# Patient Record
Sex: FEMALE | Race: WHITE | NOT HISPANIC OR LATINO | Employment: UNEMPLOYED | URBAN - METROPOLITAN AREA
[De-identification: names, ages, dates, MRNs, and addresses within clinical notes are randomized per-mention and may not be internally consistent; named-entity substitution may affect disease eponyms.]

---

## 2017-01-10 ENCOUNTER — OFFICE VISIT (OUTPATIENT)
Dept: MEDICAL GROUP | Age: 72
End: 2017-01-10
Payer: MEDICARE

## 2017-01-10 VITALS
SYSTOLIC BLOOD PRESSURE: 138 MMHG | BODY MASS INDEX: 37.32 KG/M2 | OXYGEN SATURATION: 91 % | DIASTOLIC BLOOD PRESSURE: 78 MMHG | TEMPERATURE: 97.6 F | HEIGHT: 63 IN | WEIGHT: 210.6 LBS | HEART RATE: 67 BPM

## 2017-01-10 DIAGNOSIS — E78.00 HYPERCHOLESTEROLEMIA: ICD-10-CM

## 2017-01-10 DIAGNOSIS — Z12.31 VISIT FOR SCREENING MAMMOGRAM: ICD-10-CM

## 2017-01-10 DIAGNOSIS — K90.0 CELIAC DISEASE: ICD-10-CM

## 2017-01-10 DIAGNOSIS — J45.30 MILD PERSISTENT ASTHMA WITHOUT COMPLICATION: ICD-10-CM

## 2017-01-10 DIAGNOSIS — L30.9 ECZEMA OF BOTH HANDS: ICD-10-CM

## 2017-01-10 DIAGNOSIS — Z23 NEED FOR PNEUMOCOCCAL VACCINATION: ICD-10-CM

## 2017-01-10 PROCEDURE — 90732 PPSV23 VACC 2 YRS+ SUBQ/IM: CPT | Performed by: INTERNAL MEDICINE

## 2017-01-10 PROCEDURE — 99214 OFFICE O/P EST MOD 30 MIN: CPT | Mod: 25 | Performed by: INTERNAL MEDICINE

## 2017-01-10 PROCEDURE — G0009 ADMIN PNEUMOCOCCAL VACCINE: HCPCS | Performed by: INTERNAL MEDICINE

## 2017-01-10 NOTE — MR AVS SNAPSHOT
"        Rayne Colorado   1/10/2017 2:00 PM   Office Visit   MRN: [id number]    Department:  [address] [place] [address]   Dept Phone:  [phone]    Description:  Female : 1945   Provider:  Carol Jang M.D.           Reason for Visit     Follow-Up Labs      Allergies as of 1/10/2017     No Known Allergies      You were diagnosed with     Mild persistent asthma without complication   [209268]       Hypercholesterolemia   [634700]       Celiac disease   [579.0.ICD-9-CM]       Need for pneumococcal vaccination   [443165]       Visit for screening mammogram   [287336]         Vital Signs     Blood Pressure Pulse Temperature Height Weight Body Mass Index    138/78 mmHg 67 36.4 °C (97.6 °F) 1.588 m (5' 2.5\") 95.528 kg (210 lb 9.6 oz) 37.88 kg/m2    Oxygen Saturation Smoking Status                91% Former Smoker          Basic Information     Date Of Birth Sex Race Ethnicity Preferred Language    1945 Female White Non- English      Your appointments     2017 11:20 AM   Established Patient with Carol Jang M.D.   [address] [id number] [phone]           You will be receiving a confirmation call a few days before your appointment from our automated call confirmation system.              Problem List              ICD-10-CM Priority Class Noted - Resolved    Celiac disease K90.0   10/4/2016 - Present    Psoriasis L40.9   10/4/2016 - Present    Mild persistent asthma without complication J45.30   10/4/2016 - Present    Hypercholesterolemia E78.00   1/10/2017 - Present      Health Maintenance        Date Due Completion Dates    MAMMOGRAM 7/10/1986 ---    COLONOSCOPY 7/10/1996 ---    IMM ZOSTER VACCINE 7/10/2006 ---    BONE DENSITY 7/10/2011 ---    IMM INFLUENZA (1) 2016    IMM PNEUMOCOCCAL 65+ (ADULT) LOW/MEDIUM RISK SERIES (2 of 2 - PPSV23) 2016    IMM DTaP/Tdap/Td Vaccine (2 - Td) 2/10/2026 " 2/10/2016            Current Immunizations     13-VALENT PCV PREVNAR 11/23/2015    Influenza TIV (IM) 11/5/2015    Pneumococcal polysaccharide vaccine (PPSV-23)  Incomplete    Tdap Vaccine 2/10/2016      Below and/or attached are the medications your provider expects you to take. Review all of your home medications and newly ordered medications with your provider and/or pharmacist. Follow medication instructions as directed by your provider and/or pharmacist. Please keep your medication list with you and share with your provider. Update the information when medications are discontinued, doses are changed, or new medications (including over-the-counter products) are added; and carry medication information at all times in the event of emergency situations     Allergies:  No Known Allergies          Medications  Valid as of: January 10, 2017 -  3:01 PM    Generic Name Brand Name Tablet Size Instructions for use    Albuterol Sulfate (Aero Soln) albuterol 108 (90 BASE) MCG/ACT Inhale 2 Puffs by mouth every 6 hours as needed for Shortness of Breath.        Cholecalciferol (Cap) Vitamin D 2000 UNITS Take  by mouth.        Clobetasol Propionate (Cream) TEMOVATE 0.05 % Apply twice a day on affected skin for 7 days.        Fluticasone-Salmeterol (AEROSOL POWDER, BREATH ACTIVATED) ADVAIR DISKUS 500-50 MCG/DOSE         Montelukast Sodium (Tab) SINGULAIR 10 MG Take 1 Tab by mouth every day.        Probiotic Product   Take  by mouth.        Psyllium   Take  by mouth.        .                 Medicines prescribed today were sent to:     [facility] [address] [address]    Phone: [phone] Fax: [phone]    Open 24 Hours?: No      Medication refill instructions:       If your prescription bottle indicates you have medication refills left, it is not necessary to call your provider’s office. Please contact your pharmacy and they will refill your medication.     If your prescription bottle indicates you do not have any refills left, you may request refills at any time through one of the following ways: The online [employer] system (except Urgent Care), by calling your provider’s office, or by asking your pharmacy to contact your provider’s office with a refill request. Medication refills are processed only during regular business hours and may not be available until the next business day. Your provider may request additional information or to have a follow-up visit with you prior to refilling your medication.   *Please Note: Medication refills are assigned a new Rx number when refilled electronically. Your pharmacy may indicate that no refills were authorized even though a new prescription for the same medication is available at the pharmacy. Please request the medicine by name with the pharmacy before contacting your provider for a refill.        Your To Do List     Future Labs/Procedures Complete By Expires    COMP METABOLIC PANEL  As directed 1/11/2018    LIPID PROFILE  As directed 1/11/2018    MA-SCREEN MAMMO W/CAD-BILAT  As directed 2/11/2018         Ed[employer]ics Access Code: [email or web address]  Expires: 2/9/2017  3:01 PM    Your email address is not on file at [facility].  Email Addresses are required for you to sign up for [employer], please contact [phone] to verify your personal information and to provide your email address prior to attempting to register for [employer].    Rayne Colorado  [address] [place] [address]    [employer]ics  A secure, online tool to manage your health information     [facility]’s Edlogics® is a secure, online tool that connects you to your personalized health information from the privacy of your home -- day or night - making it very easy for you to manage your healthcare. Once the activation process is completed, you can even access your medical information using the Ed[employer] jayden, which is available for free in the Apple Jayden  store or Google Play store.     To learn more about [facility], visit [email or web address]    There are two levels of access available (as shown below):   My Chart Features  [facility]  Specialists [facility]  Urgent  Care Non-[facility] Doctor   Email your healthcare team securely and privately 24/7 X X X    Manage appointments: schedule your next appointment; view details of past/upcoming appointments X      Request prescription refills. X      View recent personal medical records, including lab and immunizations X X X X   View health record, including health history, allergies, medications X X X X   Read reports about your outpatient visits, procedures, consult and ER notes X X X X   See your discharge summary, which is a recap of your hospital and/or ER visit that includes your diagnosis, lab results, and care plan X X  X     How to register for [facility]:  Once your e-mail address has been verified, follow the following steps to sign up for [facility].     1. Go to  [email or web address]  2. Click on the Sign Up Now box, which takes you to the New Member Sign Up page. You will need to provide the following information:  a. Enter your [facility] Access Code exactly as it appears at the top of this page. (You will not need to use this code after you’ve completed the sign-up process. If you do not sign up before the expiration date, you must request a new code.)   b. Enter your date of birth.   c. Enter your home email address.   d. Click Submit, and follow the next screen’s instructions.  3. Create a [facility] ID. This will be your [facility] login ID and cannot be changed, so think of one that is secure and easy to remember.  4. Create a [facility] password. You can change your password at any time.  5. Enter your Password Reset Question and Answer. This can be used at a later time if you forget your password.   6. Enter your e-mail address. This allows you to receive e-mail notifications when  new information is available in [employer].  7. Click Sign Up. You can now view your health information.    For assistance activating your [employer] account, call [phone]

## 2017-01-10 NOTE — Clinical Note
[facility]  Carol Jang M.D.  [address] [place] [address]  Fax: [phone] Authorization for Release/Disclosure of Protected Health Information   Name: BROOKE COLORADO : 1945 SSN: [id number]   Address: [address] [place] [address] Phone:    [phone] (home)    I authorize the entity listed below to release/disclose the PHI below to [facility]/Carol Jang M.D.   Provider or Entity Name:       Address   City, State, [facility]   Phone:      Fax:     Reason for request: continuity of care   Information to be released:    [  ] LAST COLONOSCOPY, including any PATH REPORT [  ] LAST DEXA  [  ] LAST MAMMOGRAM  [  ] LAST PAP [  ] RETINA EXAM REPORT  [  ] IMMUNIZATION RECORDS  [  ] Release all info      [  ] Check here and initial the line next to each item to release ALL health information INCLUDING  _____ Care and treatment for drug and / or alcohol abuse  _____ HIV testing, infection status, or AIDS  _____ Genetic Testing    DATES OF SERVICE OR TIME PERIOD TO BE DISCLOSED: _____________  I understand and acknowledge that:  * This Authorization may be revoked at any time by you in writing, except if your health information has already been used or disclosed.  * Your health information that will be used or disclosed as a result of you signing this authorization could be re-disclosed by the recipient. If this occurs, your re-disclosed health information may no longer be protected by State or Federal laws.  * You may refuse to sign this Authorization. Your refusal will not affect your ability to obtain treatment.  * This Authorization becomes effective upon signing and will  on (date) __________. If no date is indicated, this Authorization will  one (1) year from the signature date.    Name: Brooke Colorado    Signature:     Date: 1/10/2017

## 2017-01-11 NOTE — PROGRESS NOTES
Subjective:   Rayne Colorado is a 71 y.o. female here today for evaluation and management of:      Celiac disease  Patient is taking Metamucil and probiotic daily. She has regular bowel movement without diarrhea or constipation. She avoid gluten. She stated that she has colonoscopy which she had consult in last year and was told to repeat it in 5 years. We will obtained a medical report from GI consultant.    Eczema of both hands  Patient was evaluated by dermatologist and was told that her rash is eczema. She is taking clobetasol cream and moisturizing cream without significant improvement.    Mild persistent asthma without complication  Patient is taking Advair diskus 500-50 mcg one puff twice a day and albuterol inhaler once a day as needed. She also takes singular 10 mg daily. Her symptoms well controlled with current regimens. She reported that her asthma was not well controlled when she was treated with low-dose Advair 250-50. She wanted to keep the same regimens for now. She quit smoking in 2007.    Hypercholesterolemia  Patient has high total cholesterol and LDL cholesterol. She has never been treated with medication for cholesterol.  Lipid panel on 1/3/17: Total cholesterol 225, HDL 78, , triglyceride 105, non-HDL cholesterol 147, cholesterol/HDL ratio 2.9.          Current medicines (including changes today)  Current Outpatient Prescriptions   Medication Sig Dispense Refill   • ADVAIR DISKUS 500-50 MCG/DOSE AEROSOL POWDER, BREATH ACTIVATED   1   • montelukast (SINGULAIR) 10 MG Tab Take 1 Tab by mouth every day. 30 Tab 11   • albuterol 108 (90 BASE) MCG/ACT Aero Soln inhalation aerosol Inhale 2 Puffs by mouth every 6 hours as needed for Shortness of Breath. 8.5 g 3   • clobetasol (TEMOVATE) 0.05 % Cream Apply twice a day on affected skin for 7 days. 1 Tube 3   • Probiotic Product (PROBIOTIC ADVANCED PO) Take  by mouth.     • Psyllium (METAMUCIL PO) Take  by mouth.     • Cholecalciferol (VITAMIN D)  "2000 UNITS Cap Take  by mouth.       No current facility-administered medications for this visit.     She  has a past medical history of Asthma and Celiac disease.    ROS   No chest pain, no shortness of breath, no abdominal pain       Objective:     Blood pressure 138/78, pulse 67, temperature 36.4 °C (97.6 °F), height 1.588 m (5' 2.5\"), weight 95.528 kg (210 lb 9.6 oz), SpO2 91 %. Body mass index is 37.88 kg/(m^2).   Physical Exam:  General: Alert, oriented and no acute distress.  Eye contact is good, speech goal directed, affect calm  HEENT: conjunctiva non-injected, sclera non-icteric.  Oral mucous membranes pink and moist with no lesions.  Pinna normal.   Lungs: Normal respiratory effort, clear to auscultation bilaterally with good excursion.  CV: regular rate and rhythm. No murmurs.   Abdomen: soft, non distended, nontender, Bowel sound normal.  Ext: no edema, color normal, vascularity normal, temperature normal  Skin: Scaly rash on both palms, dry skin on both hands    Assessment and Plan:   The following treatment plan was discussed     1. Mild persistent asthma without complication  - Well-controlled. Continue current regimens. Reviewed the side effects of advair and albuterol inhaler with her.  - Continue singular 10 mg daily.    2. Hypercholesterolemia  - Continue to control with diet and exercise.  - Advised to eat low fat, low carbohydrate and high fiber diet as well as do cardio physical exercise regularly.   - Recheck cholesterol in 6 months.  - COMP METABOLIC PANEL; Future  - LIPID PROFILE; Future    3. Celiac disease  - Well-controlled. Continue to avoid gluten diet. Continue probiotic and Metamucil.    4. Eczema of both hands  - Discussed to try over-the-counter coal tar cream and she can use clobetasol cream alternately.   - Advised to use moisturizing cream on both hands.     5. Need for pneumococcal vaccination  - Pneumovax 23- vaccine was given today after reviewing risks and benefits as well " as side effects of vaccine.  - PNEUMOCOCCAL POLYSACCHARIDE VACCINE 23-VALENT =>3YO SQ/IM    6. Visit for screening mammogram  - Order screening mammogram  - MA-SCREEN MAMMO W/CAD-BILAT; Future         Followup: Return in about 6 months (around 7/10/2017), or if symptoms worsen or fail to improve, for asthma, hypercholesterolemia, eczema, lab review.      Please note that this dictation was created using voice recognition software. I have made every reasonable attempt to correct obvious errors, but I expect that there may have unintended errors in text, spelling, punctuation, or grammar that I did not discover.

## 2017-01-11 NOTE — ASSESSMENT & PLAN NOTE
Patient was evaluated by dermatologist and was told that her rash is eczema. She is taking clobetasol cream and moisturizing cream without significant improvement.

## 2017-01-11 NOTE — ASSESSMENT & PLAN NOTE
Patient is taking Metamucil and probiotic daily. She has regular bowel movement without diarrhea or constipation. She avoid gluten. She stated that she has colonoscopy which she had consult in last year and was told to repeat it in 5 years. We will obtained a medical report from GI consultant.

## 2017-01-11 NOTE — ASSESSMENT & PLAN NOTE
Patient has high total cholesterol and LDL cholesterol. She has never been treated with medication for cholesterol.  Lipid panel on 1/3/17: Total cholesterol 225, HDL 78, , triglyceride 105, non-HDL cholesterol 147, cholesterol/HDL ratio 2.9.

## 2017-04-15 DIAGNOSIS — J45.30 MILD PERSISTENT ASTHMA WITHOUT COMPLICATION: ICD-10-CM

## 2017-06-27 ENCOUNTER — PATIENT OUTREACH (OUTPATIENT)
Dept: HEALTH INFORMATION MANAGEMENT | Facility: OTHER | Age: 72
End: 2017-06-27

## 2017-06-27 NOTE — PROGRESS NOTES
Outcome: CALL BACK    WebIZ Checked & Epic Updated:  yes    [facility] Verified: no    Attempt # 1

## 2017-06-30 ENCOUNTER — OFFICE VISIT (OUTPATIENT)
Dept: MEDICAL GROUP | Facility: CLINIC | Age: 72
End: 2017-06-30
Payer: MEDICARE

## 2017-06-30 VITALS
TEMPERATURE: 97.5 F | DIASTOLIC BLOOD PRESSURE: 82 MMHG | HEIGHT: 62 IN | SYSTOLIC BLOOD PRESSURE: 126 MMHG | BODY MASS INDEX: 38.64 KG/M2 | WEIGHT: 210 LBS | OXYGEN SATURATION: 96 % | HEART RATE: 53 BPM

## 2017-06-30 DIAGNOSIS — K90.0 CELIAC DISEASE: ICD-10-CM

## 2017-06-30 DIAGNOSIS — Z00.00 MEDICARE ANNUAL WELLNESS VISIT, INITIAL: Primary | ICD-10-CM

## 2017-06-30 DIAGNOSIS — L30.9 ECZEMA OF BOTH HANDS: ICD-10-CM

## 2017-06-30 DIAGNOSIS — J45.30 MILD PERSISTENT ASTHMA WITHOUT COMPLICATION: ICD-10-CM

## 2017-06-30 DIAGNOSIS — E78.00 HYPERCHOLESTEROLEMIA: ICD-10-CM

## 2017-06-30 PROCEDURE — G0438 PPPS, INITIAL VISIT: HCPCS | Performed by: NURSE PRACTITIONER

## 2017-06-30 RX ORDER — SODIUM PHOSPHATE,MONO-DIBASIC 19G-7G/118
500 ENEMA (ML) RECTAL
COMMUNITY

## 2017-06-30 RX ORDER — CHLORAL HYDRATE 500 MG
1000 CAPSULE ORAL 2 TIMES DAILY WITH MEALS
COMMUNITY

## 2017-06-30 RX ORDER — M-VIT,TX,IRON,MINS/CALC/FOLIC 27MG-0.4MG
1 TABLET ORAL DAILY
COMMUNITY

## 2017-06-30 RX ORDER — LORATADINE 10 MG/1
10 TABLET ORAL DAILY
COMMUNITY

## 2017-06-30 ASSESSMENT — PATIENT HEALTH QUESTIONNAIRE - PHQ9: CLINICAL INTERPRETATION OF PHQ2 SCORE: 0

## 2017-06-30 NOTE — ASSESSMENT & PLAN NOTE
Chronic condition managed with current medical regimen  Stable per review, labs of 1/13/2017 reviewed   Continue with diet and exercise  Followed by Carol Jang M.D..

## 2017-06-30 NOTE — ASSESSMENT & PLAN NOTE
Chronic condition managed with current medical regimen  Stable per review with occ flares   Continue with current meds, effective most of the time  Followed by derm

## 2017-06-30 NOTE — ASSESSMENT & PLAN NOTE
Chronic condition managed with current medical regimen  Stable per review with intermit flares even with a controlled diet, describes pain   Continue with gluten free diet  Followed by GI

## 2017-06-30 NOTE — MR AVS SNAPSHOT
"        Rayne Salinaspiotr   2017 1:00 PM   Office Visit   MRN: [id number]    Department:  [facility]   Dept Phone:  [phone]    Description:  Female : 1945   Provider:  RIC Bryson           Reason for Visit     Annual Exam initial      Allergies as of 2017     No Known Allergies      You were diagnosed with     Medicare annual wellness visit, initial   [845464]  -  Primary     Mild persistent asthma without complication   [527381]       Hypercholesterolemia   [634999]       Eczema of both hands   [8932262]       Celiac disease   [579.0.ICD-9-CM]         Vital Signs     Blood Pressure Pulse Temperature Height Weight Body Mass Index    126/82 mmHg 53 36.4 °C (97.5 °F) 1.575 m (5' 2.01\") 95.255 kg (210 lb) 38.40 kg/m2    Oxygen Saturation Smoking Status                96% Former Smoker          Basic Information     Date Of Birth Sex Race Ethnicity Preferred Language    1945 Female White Non- English      Your appointments     2017 11:20 AM   Established Patient with Carol Jang M.D.   [address]  [place] [address] [phone]           You will be receiving a confirmation call a few days before your appointment from our automated call confirmation system.            Aug 17, 2017 11:00 AM   MA SCRN10 with JEFF SAUCEDA MG 1   [facility] ([place])    [address]  [place] [id number] [phone]           No deodorant, powder, perfume or lotion under the arm or breast area.            Aug 17, 2017 11:15 AM   BONE DENSITY (DEXASCAN) with JEFF SAUCEDA BD 1   [facility]    [facility] [place]  [address]  [address]  [place] [id number] [phone]           No calcium supplements 24 hours prior to exam, including antacids or multivitamins w/calcium.  Pt may eat and drink normally.  No injection " procedures prior to Dexa on the same day.  No barium contrast, no CTs with IV or oral contrast, no Pet/CTs and no Nuc Med injections for 7 days prior to a Dexa (including Barium Swallow, Upper GI, Small Bowel Series).  If scheduling a Dexa on the same day as a CT with IV or oral contrast, any test with barium, Pet/CT or a Nuc Med with injection, always schedule the Dexa before the other study.              Problem List              ICD-10-CM Priority Class Noted - Resolved    Celiac disease K90.0   10/4/2016 - Present    Eczema of both hands L30.9   10/4/2016 - Present    Mild persistent asthma without complication J45.30   10/4/2016 - Present    Hypercholesterolemia E78.00   1/10/2017 - Present      Health Maintenance        Date Due Completion Dates    MAMMOGRAM 7/10/1986 ---    COLONOSCOPY 7/10/1996 ---    IMM ZOSTER VACCINE 7/10/2006 ---    BONE DENSITY 7/10/2011 ---    IMM DTaP/Tdap/Td Vaccine (2 - Td) 2/10/2026 2/10/2016            Current Immunizations     13-VALENT PCV PREVNAR 11/23/2015    Influenza TIV (IM) 11/5/2015    Pneumococcal polysaccharide vaccine (PPSV-23) 1/10/2017    Tdap Vaccine 2/10/2016      Below and/or attached are the medications your provider expects you to take. Review all of your home medications and newly ordered medications with your provider and/or pharmacist. Follow medication instructions as directed by your provider and/or pharmacist. Please keep your medication list with you and share with your provider. Update the information when medications are discontinued, doses are changed, or new medications (including over-the-counter products) are added; and carry medication information at all times in the event of emergency situations     Allergies:  No Known Allergies          Medications  Valid as of: June 30, 2017 -  2:38 PM    Generic Name Brand Name Tablet Size Instructions for use    Albuterol Sulfate (Aero Soln) albuterol 108 (90 BASE) MCG/ACT Inhale 2 Puffs by mouth every 6  hours as needed for Shortness of Breath.        Calcium Carbonate-Vitamin D   Take  by mouth.        Cholecalciferol (Cap) Vitamin D 2000 UNITS Take  by mouth.        Clobetasol Propionate (Cream) TEMOVATE 0.05 % Apply twice a day on affected skin for 7 days.        Fluticasone-Salmeterol (AEROSOL POWDER, BREATH ACTIVATED) ADVAIR DISKUS 500-50 MCG/DOSE inhale 1 puff by mouth twice a day IN THE MORNING AND EVENING APPROXIMATELY 12 HOURS APART        Glucosamine Sulfate (Cap) glucosamine Sulfate 500 MG Take 500 mg by mouth 3 times a day, with meals.        Loratadine (Tab) CLARITIN 10 MG Take 10 mg by mouth every day.        Montelukast Sodium (Tab) SINGULAIR 10 MG Take 1 Tab by mouth every day.        Multiple Vitamins-Minerals (Tab) THERAGRAN-M  Take 1 Tab by mouth every day.        Omega-3 Fatty Acids (Cap) fish oil 1000 MG Take 1,000 mg by mouth 3 times a day, with meals.        Probiotic Product   Take  by mouth.        Psyllium   Take  by mouth.        .                 Medicines prescribed today were sent to:     [facility] [address] [address]    Phone: [phone] Fax: [phone]    Open 24 Hours?: No      Medication refill instructions:       If your prescription bottle indicates you have medication refills left, it is not necessary to call your provider’s office. Please contact your pharmacy and they will refill your medication.    If your prescription bottle indicates you do not have any refills left, you may request refills at any time through one of the following ways: The online [employer] system (except Urgent Care), by calling your provider’s office, or by asking your pharmacy to contact your provider’s office with a refill request. Medication refills are processed only during regular business hours and may not be available until the next business day. Your provider may request additional information or to have a follow-up visit  "with you prior to refilling your medication.   *Please Note: Medication refills are assigned a new Rx number when refilled electronically. Your pharmacy may indicate that no refills were authorized even though a new prescription for the same medication is available at the pharmacy. Please request the medicine by name with the pharmacy before contacting your provider for a refill.        Instructions    Gluten-Free Diet for Celiac Disease  Gluten is a protein found in wheat, rye, barley, and triticale (a cross between wheat and rye) grains. People with celiac disease need to have a gluten-free diet. With celiac disease, gluten interferes with the absorption of food and may also cause intestinal injury.   Strict compliance is important even during symptom-free periods. This means eliminating all foods with gluten from your diet permanently. This requires some significant changes but is very manageable.  WHAT DO I NEED TO KNOW ABOUT A GLUTEN-FREE DIET?  · Look for items labeled with \"GF.\" Looking for GF will make it easier to identify products that are safe to eat.  · Read all labels. Gluten may have been added as a minor ingredient where least expected, such as in shredded cheeses or ice creams. Always check food labels and investigate questionable ingredients. Talk to your dietitian or health care provider if you have questions about certain foods or need help finding GF foods.  · Check when in doubt. If you are not sure whether an ingredient contains gluten, check with the . Note that some manufacturers may change ingredients without notice. Always read labels.    · Know how food is prepared. Since flour and cereal products are often used in the preparation of foods, it is important to be aware of the methods of preparation used, as well as the ingredients in the foods themselves. This is especially true when you are dining out. Ask restaurants if they have a gluten-free menu.  · Watch for " "cross-contamination. Cross-contamination occurs when gluten-free foods come into contact with foods that contain gluten. It often happens during the manufacturing process. Always check the ingredient list and for warnings on packages, such as \"may contain gluten.\"  · Eat a balanced diet. It is important to still get enough fiber, iron, and B vitamins in your diet. Look for enriched whole grain gluten-free products and continue to eat a well-balanced diet of the important non-grain items, such as vegetables, fruit, lean proteins, legumes, and dairy.  · Consider taking a gluten-free multivitamin and mineral supplement. Discuss this with your health care provider.  WHAT KEY WORDS HELP IDENTIFY GLUTEN?  Know key words to help identify gluten. A dietitian can help you identify possible harmful ingredients in the foods you normally eat. Words to check for on food labels include:   · Flour, enriched flour, bromated flour, white flour, durum flour, daquan flour, phosphated flour, self-rising flour, semolina, or farina.  · Starch, dextrin, modified food starch, or cereal.  · Thickening, fillers, or emulsifiers.  · Any kind of malt flavoring, extract, or syrup (malt is made from barley and includes malt vinegar, malted milk, and malted beverages).  · Hydrolyzed vegetable protein.  WHAT FOODS CAN I EAT?  Below is a list of common foods that are allowed with a gluten-free diet.   Grains  Products made from the following flours or grains: amaranth, bean flours, 100% buckwheat flour, corn, millet, nut flours or meals, GF oats, quinoa, rice, sorghum, teff, any all-purpose 100% GF flour mix, rice wafers, pure cornmeal tortillas, popcorn, some crackers, some chips, and hot cereals made from cornmeal. Ask your dietitian which specific hot and cold cereals are allowed. [place], rice or wild rice, and special GF pasta. Some Asian rice noodles or bean noodles. Arrowroot starch, corn bran, corn flour, corn germ, cornmeal, corn starch, " potato flour, potato starch flour, and rice bran. Rice flours: plain, brown, and sweet. Rice polish, soy flour, tapioca starch.  Vegetables   All plain, fresh, frozen, or canned vegetables.    Fruits   All fresh, frozen, canned, dried fruits, and fruit juices.   Meats and Other Protein Foods  Meat, fish, poultry, or eggs prepared without added wheat, rye, barley, or triticale. Some luncheon meat and some frankfurters. Pure meat. All aged cheese, most processed cheese products, some cottage cheese, and some cream cheese. Dried beans, dried peas, and lentils.    Dairy   Milk and yogurt made with allowed ingredients.   Beverages   Coffee (regular or decaffeinated), tea, herbal tea (read label to be sure that no wheat flour has been added). Carbonated beverages and some root beers. Wine, sake, and distilled spirits, such as gin, vodka, and whiskey. GF beers and GF ciders.   Sweets and Desserts  Sugar, honey, some syrups, molasses, jelly, jam, plain hard candy, marshmallows, gumdrops, homemade candies free of wheat, rye, barley, or triticale. Coconut. Custard, some pudding mixes, and homemade puddings from cornstarch, rice, and tapioca. Gelatin desserts, sorbets, frozen ice pops, and sherbet. Cake, cookies, and other desserts prepared with allowed flours. Some commercial ice creams. Ask your dietitian about specific brands of dessert that are allowed.   Fats and Oils   Butter, margarine, vegetable oil, sour cream not containing modified food starch, whipping cream, shortening, lard, cream, and some mayonnaise. Some commercial salad dressings. Peanut butter.   Other  Homemade broth and soups made with allowed ingredients; some canned or frozen soups. Any other combination or prepared foods that do not contain gluten. Monosodium glutamate (MSG). Cider, rice, and wine vinegar. Baking soda and baking powder. Certain soy sauces (Tamari). Ask your dietitian about specific brands that are allowed. Nuts, coconut, chocolate, and  pure cocoa powder. Salt, pepper, herbs, spices, extracts, and food colorings.  The items listed above may not be a complete list of allowed foods or beverages. Contact your dietitian for more options.   WHAT FOODS CAN I NOT EAT?  Below is a list of common foods that are not allowed with a gluten-free diet.   Grains  Barley, bran, bulgur, cracked wheat, daquan, malt, matzo, wheat germ, and all wheat and rye cereals including spelt and kamut. Avoid cereals containing malt as a flavoring, such as rice cereal. Also avoid regular noodles, spaghetti, macaroni, and most packaged rice mixes, and all others containing wheat, rye, barley, or triticale.   Vegetables   Most creamed vegetables, most vegetables canned in sauces, and any vegetables prepared with wheat, rye, barley, or triticale.   Fruits   Thickened or prepared fruits and some pie fillings.   Meats and Other Protein Sources  Any meat or meat alternative containing wheat, rye, barley, or gluten stabilizers (such as some hot dogs, salami, cold cuts, or sausage). Bread-containing products, such as Swiss steak, croquettes, and meatloaf. Most tuna canned in vegetable broth, turkey with hydrolyzed vegetable protein (HVP) injected as part of the basting, and any cheese product containing oat gum as an ingredient. Seitan. Imitation fish.  Dairy   Commercial chocolate milk, which may have cereal added, and malted milk.  Beverages   Certain cereal beverages. Beer and ciders (unless GF), vinny, malted milk, and some root beers.  Sweets and Desserts  Commercial candies containing wheat, rye, barley, or triticale. Certain toffees are dusted with wheat flour. Chocolate-coated nuts, which are often rolled in flour. Cakes, cookies, doughnuts, and pastries that are prepared with wheat, barley, rye, or triticale flour. Some commercial ice creams, ice cream flavors which contain cookies, crumbs, or cheesecake. Ice cream cones. Commercially prepared mixes for cakes, cookies, and other  desserts unless marked GF. Bread pudding and other puddings thickened with flour.  Fats and Oils   Some commercial salad dressings and sour cream containing modified food starch.   Condiments  Some gardner powder, some dry seasoning mixes, some gravy extracts, some meat sauces, some ketchup, some prepared mustard, horseradish.  Other  All soups containing wheat, rye, barley, or triticale flour. Bouillon and bouillon cubes that contain HVP. Combination or prepared foods that contain gluten. Some soy sauce, some chip dips, and some chewing gum. Yeast extract (contains barley). Caramel color (may contain malt).  The items listed above may not be a complete list of foods and beverages to avoid. Contact your dietitian for more information.     This information is not intended to replace advice given to you by your health care provider. Make sure you discuss any questions you have with your health care provider.     Document Released: 12/18/2006 Document Revised: 01/08/2016 Document Reviewed: 10/22/2014  [employer] Interactive Patient Education ©2016 [employer]            MyChart Status: Patient Declined

## 2017-06-30 NOTE — PATIENT INSTRUCTIONS
"Gluten-Free Diet for Celiac Disease  Gluten is a protein found in wheat, rye, barley, and triticale (a cross between wheat and rye) grains. People with celiac disease need to have a gluten-free diet. With celiac disease, gluten interferes with the absorption of food and may also cause intestinal injury.   Strict compliance is important even during symptom-free periods. This means eliminating all foods with gluten from your diet permanently. This requires some significant changes but is very manageable.  WHAT DO I NEED TO KNOW ABOUT A GLUTEN-FREE DIET?  · Look for items labeled with \"GF.\" Looking for GF will make it easier to identify products that are safe to eat.  · Read all labels. Gluten may have been added as a minor ingredient where least expected, such as in shredded cheeses or ice creams. Always check food labels and investigate questionable ingredients. Talk to your dietitian or health care provider if you have questions about certain foods or need help finding GF foods.  · Check when in doubt. If you are not sure whether an ingredient contains gluten, check with the . Note that some manufacturers may change ingredients without notice. Always read labels.    · Know how food is prepared. Since flour and cereal products are often used in the preparation of foods, it is important to be aware of the methods of preparation used, as well as the ingredients in the foods themselves. This is especially true when you are dining out. Ask restaurants if they have a gluten-free menu.  · Watch for cross-contamination. Cross-contamination occurs when gluten-free foods come into contact with foods that contain gluten. It often happens during the manufacturing process. Always check the ingredient list and for warnings on packages, such as \"may contain gluten.\"  · Eat a balanced diet. It is important to still get enough fiber, iron, and B vitamins in your diet. Look for enriched whole grain gluten-free products " and continue to eat a well-balanced diet of the important non-grain items, such as vegetables, fruit, lean proteins, legumes, and dairy.  · Consider taking a gluten-free multivitamin and mineral supplement. Discuss this with your health care provider.  WHAT KEY WORDS HELP IDENTIFY GLUTEN?  Know key words to help identify gluten. A dietitian can help you identify possible harmful ingredients in the foods you normally eat. Words to check for on food labels include:   · Flour, enriched flour, bromated flour, white flour, durum flour, daquan flour, phosphated flour, self-rising flour, semolina, or farina.  · Starch, dextrin, modified food starch, or cereal.  · Thickening, fillers, or emulsifiers.  · Any kind of malt flavoring, extract, or syrup (malt is made from barley and includes malt vinegar, malted milk, and malted beverages).  · Hydrolyzed vegetable protein.  WHAT FOODS CAN I EAT?  Below is a list of common foods that are allowed with a gluten-free diet.   Grains  Products made from the following flours or grains: amaranth, bean flours, 100% buckwheat flour, corn, millet, nut flours or meals, GF oats, quinoa, rice, sorghum, teff, any all-purpose 100% GF flour mix, rice wafers, pure cornmeal tortillas, popcorn, some crackers, some chips, and hot cereals made from cornmeal. Ask your dietitian which specific hot and cold cereals are allowed. [place], rice or wild rice, and special GF pasta. Some Asian rice noodles or bean noodles. Arrowroot starch, corn bran, corn flour, corn germ, cornmeal, corn starch, potato flour, potato starch flour, and rice bran. Rice flours: plain, brown, and sweet. Rice polish, soy flour, tapioca starch.  Vegetables   All plain, fresh, frozen, or canned vegetables.    Fruits   All fresh, frozen, canned, dried fruits, and fruit juices.   Meats and Other Protein Foods  Meat, fish, poultry, or eggs prepared without added wheat, rye, barley, or triticale. Some luncheon meat and some frankfurters.  Pure meat. All aged cheese, most processed cheese products, some cottage cheese, and some cream cheese. Dried beans, dried peas, and lentils.    Dairy   Milk and yogurt made with allowed ingredients.   Beverages   Coffee (regular or decaffeinated), tea, herbal tea (read label to be sure that no wheat flour has been added). Carbonated beverages and some root beers. Wine, sake, and distilled spirits, such as gin, vodka, and whiskey. GF beers and GF ciders.   Sweets and Desserts  Sugar, honey, some syrups, molasses, jelly, jam, plain hard candy, marshmallows, gumdrops, homemade candies free of wheat, rye, barley, or triticale. Coconut. Custard, some pudding mixes, and homemade puddings from cornstarch, rice, and tapioca. Gelatin desserts, sorbets, frozen ice pops, and sherbet. Cake, cookies, and other desserts prepared with allowed flours. Some commercial ice creams. Ask your dietitian about specific brands of dessert that are allowed.   Fats and Oils   Butter, margarine, vegetable oil, sour cream not containing modified food starch, whipping cream, shortening, lard, cream, and some mayonnaise. Some commercial salad dressings. Peanut butter.   Other  Homemade broth and soups made with allowed ingredients; some canned or frozen soups. Any other combination or prepared foods that do not contain gluten. Monosodium glutamate (MSG). Cider, rice, and wine vinegar. Baking soda and baking powder. Certain soy sauces (Tamari). Ask your dietitian about specific brands that are allowed. Nuts, coconut, chocolate, and pure cocoa powder. Salt, pepper, herbs, spices, extracts, and food colorings.  The items listed above may not be a complete list of allowed foods or beverages. Contact your dietitian for more options.   WHAT FOODS CAN I NOT EAT?  Below is a list of common foods that are not allowed with a gluten-free diet.   Grains  Barley, bran, bulgur, cracked wheat, daquan, malt, matzo, wheat germ, and all wheat and rye cereals  including spelt and kamut. Avoid cereals containing malt as a flavoring, such as rice cereal. Also avoid regular noodles, spaghetti, macaroni, and most packaged rice mixes, and all others containing wheat, rye, barley, or triticale.   Vegetables   Most creamed vegetables, most vegetables canned in sauces, and any vegetables prepared with wheat, rye, barley, or triticale.   Fruits   Thickened or prepared fruits and some pie fillings.   Meats and Other Protein Sources  Any meat or meat alternative containing wheat, rye, barley, or gluten stabilizers (such as some hot dogs, salami, cold cuts, or sausage). Bread-containing products, such as Swiss steak, croquettes, and meatloaf. Most tuna canned in vegetable broth, turkey with hydrolyzed vegetable protein (HVP) injected as part of the basting, and any cheese product containing oat gum as an ingredient. Seitan. Imitation fish.  Dairy   Commercial chocolate milk, which may have cereal added, and malted milk.  Beverages   Certain cereal beverages. Beer and ciders (unless GF), vinny, malted milk, and some root beers.  Sweets and Desserts  Commercial candies containing wheat, rye, barley, or triticale. Certain toffees are dusted with wheat flour. Chocolate-coated nuts, which are often rolled in flour. Cakes, cookies, doughnuts, and pastries that are prepared with wheat, barley, rye, or triticale flour. Some commercial ice creams, ice cream flavors which contain cookies, crumbs, or cheesecake. Ice cream cones. Commercially prepared mixes for cakes, cookies, and other desserts unless marked GF. Bread pudding and other puddings thickened with flour.  Fats and Oils   Some commercial salad dressings and sour cream containing modified food starch.   Condiments  Some gardner powder, some dry seasoning mixes, some gravy extracts, some meat sauces, some ketchup, some prepared mustard, horseradish.  Other  All soups containing wheat, rye, barley, or triticale flour. Bouillon and bouillon  cubes that contain HVP. Combination or prepared foods that contain gluten. Some soy sauce, some chip dips, and some chewing gum. Yeast extract (contains barley). Caramel color (may contain malt).  The items listed above may not be a complete list of foods and beverages to avoid. Contact your dietitian for more information.     This information is not intended to replace advice given to you by your health care provider. Make sure you discuss any questions you have with your health care provider.     Document Released: 12/18/2006 Document Revised: 01/08/2016 Document Reviewed: 10/22/2014  [employer] Interactive Patient Education ©2016 [employer]

## 2017-06-30 NOTE — PROGRESS NOTES
CC:   Medicare Annual Wellness Visit    HPI:  Rayne is a 71 y.o. here for Medicare Annual Wellness Visit    Patient Active Problem List    Diagnosis Date Noted   • Hypercholesterolemia 01/10/2017   • Celiac disease 10/04/2016   • Eczema of both hands 10/04/2016   • Mild persistent asthma without complication 10/04/2016     Current Outpatient Prescriptions   Medication Sig Dispense Refill   • therapeutic multivitamin-minerals (THERAGRAN-M) Tab Take 1 Tab by mouth every day.     • Calcium Carbonate-Vitamin D (CALCIUM-D PO) Take  by mouth.     • glucosamine Sulfate 500 MG Cap Take 500 mg by mouth 3 times a day, with meals.     • Omega-3 Fatty Acids (FISH OIL) 1000 MG Cap capsule Take 1,000 mg by mouth 3 times a day, with meals.     • loratadine (CLARITIN) 10 MG Tab Take 10 mg by mouth every day.     • ADVAIR DISKUS 500-50 MCG/DOSE AEROSOL POWDER, BREATH ACTIVATED inhale 1 puff by mouth twice a day IN THE MORNING AND EVENING APPROXIMATELY 12 HOURS APART 60 Inhaler 6   • montelukast (SINGULAIR) 10 MG Tab Take 1 Tab by mouth every day. 30 Tab 11   • albuterol 108 (90 BASE) MCG/ACT Aero Soln inhalation aerosol Inhale 2 Puffs by mouth every 6 hours as needed for Shortness of Breath. 8.5 g 3   • clobetasol (TEMOVATE) 0.05 % Cream Apply twice a day on affected skin for 7 days. 1 Tube 3   • Probiotic Product (PROBIOTIC ADVANCED PO) Take  by mouth.     • Psyllium (METAMUCIL PO) Take  by mouth.     • Cholecalciferol (VITAMIN D) 2000 UNITS Cap Take  by mouth.       No current facility-administered medications for this visit.      Current supplements: no  Chronic narcotic pain medicines: no  Allergies: Review of patient's allergies indicates no known allergies.  Exercise: yes  Current social contact/activities: Has support of family and friends      Screening:  Depression Screening    Little interest or pleasure in doing things?  0 - not at all  Feeling down, depressed , or hopeless? 0 - not at all  Trouble falling or staying  asleep, or sleeping too much?     Feeling tired or having little energy?     Poor appetite or overeating?     Feeling bad about yourself - or that you are a failure or have let yourself or your family down?    Trouble concentrating on things, such as reading the newspaper or watching television?    Moving or speaking so slowly that other people could have noticed.  Or the opposite - being so fidgety or restless that you have been moving around a lot more than usual?     Thoughts that you would be better off dead, or of hurting yourself?     Patient Health Questionnaire Score:    If depressive symptoms identified deferred to follow up visit unless specifically addressed in assessment and plan.    Interpretation of PHQ-9 Total Score   Score Severity   1-4 Minimal Depression   5-9 Mild Depression   10-14 Moderate Depression   15-19 Moderately Severe Depression   20-27 Severe Depression    Screening for Cognitive Impairment    Three Minute Recall (banana, sunrise, fence)  3/3    Draw clock face with all 12 numbers set to the hand to show 10 minures past 11 o'clock  1 5  If cognitive concerns identified deferred to follow up visit unless specifically addressed in assessment and plan.    Fall Risk Assessment    Has the patient had two or more falls in the last year or any fall with injury in the last year?  No  If Fall Risk identified deferred to follow up visit unless specifically addressed in assessment and plan.    Safety Assessment    Throw rugs on floor.  Yes  Handrails on all stairs.  Yes  Good lighting in all hallways.  Yes  Difficulty hearing.  No  Patient counseled about all safety risks that were identified.    Functional Assessment ADLs    Are there any barriers preventing you from cooking for yourself or meeting nutritional needs?  No.    Are there any barriers preventing you from driving safely or obtaining transportation?  No.    Are there any barriers preventing you from using a telephone or calling for  help?  No.    Are there any barriers preventing you from shopping?  No.    Are there any barriers preventing you from taking care of your own finances?  No.    Are there any barriers preventing you from managing your medications?  No.    Are currently engaing any exercise or physical activity?  No.       Health Maintenance Summary                Annual Wellness Visit Overdue 1945     MAMMOGRAM Overdue 7/10/1986     COLONOSCOPY Overdue 7/10/1996     IMM ZOSTER VACCINE Overdue 7/10/2006     BONE DENSITY Overdue 7/10/2011     IMM DTaP/Tdap/Td Vaccine Next Due 2/10/2026      Done 2/10/2016 Imm Admin: Tdap Vaccine          Patient Care Team:  Carol Jang M.D. as PCP - General (Internal Medicine)        Social History   Substance Use Topics   • Smoking status: Former Smoker -- 1.00 packs/day for 20 years     Types: Cigarettes     Quit date: 11/04/2007   • Smokeless tobacco: Never Used   • Alcohol Use: 4.2 oz/week     7 Shots of liquor per week      Comment: 1 every night       Family History   Problem Relation Age of Onset   • Arthritis Mother    • Heart Disease Mother      Heart Murmur   • Rheumatologic Disease Mother    • Heart Disease Sister      Heart Murmur   • Arthritis Sister    • Heart Disease Sister      Heart Murmur   • Other Father      aortic anuerysm   • Alcohol/Drug Father    • Cancer Paternal Aunt      uterine cancer     She  has a past medical history of Asthma and Celiac disease. She also has no past medical history of Encounter for long-term (current) use of other medications.   Past Surgical History   Procedure Laterality Date   • Carpal tunnel release Right      1995   • Primary c section  1980   • Primary c section  1982   • Bunionectomy       R FOOT 2012   • Tonsillectomy       60 YEARS   • Tubal coagulation laparoscopic bilateral         ROS:    Ostomy or other tubes or amputations: no  Chronic oxygen use yes  Last eye exam 2014  : Denies incontinence.   Gait: Uses no assistive device  "  Hearing excellent.    Dentition good    Exam: Blood pressure 126/82, pulse 53, temperature 36.4 °C (97.5 °F), height 1.575 m (5' 2.01\"), weight 95.255 kg (210 lb), SpO2 96 %. Body mass index is 38.4 kg/(m^2).  Alert, oriented in no acute distress.  Eye contact is good, speech goal directed, affect calm      Assessment and Plan. The following treatment and monitoring plan is recommended for all problems as listed below:   Mild persistent asthma without complication  Chronic condition managed with current medical regimen  Stable per review with meds and inhaler prn   Continue with current meds  Followed by Carol Jang M.D..        Hypercholesterolemia  Chronic condition managed with current medical regimen  Stable per review, labs of 1/13/2017 reviewed   Continue with diet and exercise  Followed by Carol Jang M.D..        Eczema of both hands  Chronic condition managed with current medical regimen  Stable per review with occ flares   Continue with current meds, effective most of the time  Followed by derm     Celiac disease  Chronic condition managed with current medical regimen  Stable per review with intermit flares even with a controlled diet, describes pain   Continue with gluten free diet  Followed by GI         Health Care Screening recommendations reviewed with patient today: per Patient Instructions  DPA/Advanced directive: .has NO advanced directive - not interested in additional information    Next office visit for recheck of chronic medical conditions is due with Carol Jang M.D. in 6 months      mammo and DEXA sched for 8/17/2017  Colonoscopy done 12/2015 GI consultants will provide a copy to Carol Jang M.D.   , polyps neg, repeat 2020    "

## 2017-06-30 NOTE — PROGRESS NOTES
Attempt #:2    WebIZ Checked & Epic Updated: yes  HealthConnect Verified: no  Verify PCP: yes    Communication Preference Obtained: yes     Review Care Team: yes    Annual Wellness Visit Scheduling  1. Scheduling Status:Scheduled    Care Gap Scheduling (Attempt to Schedule EACH Overdue Care Gap!)     Health Maintenance Due   Topic Date Due   • Annual Wellness Visit  Scheduled    • MAMMOGRAM  Scheduled    • COLONOSCOPY  Not able to address with pt    • IMM ZOSTER VACCINE  Scheduled with annual    • BONE DENSITY  Scheduled          [employer] Activation: sent activation code  [employer] Jayden: no  Virtual Visits: no  Opt In to Text Messages: no

## 2017-06-30 NOTE — ASSESSMENT & PLAN NOTE
Chronic condition managed with current medical regimen  Stable per review with meds and inhaler prn   Continue with current meds  Followed by Carol Jang M.D..

## 2017-07-11 ENCOUNTER — OFFICE VISIT (OUTPATIENT)
Dept: MEDICAL GROUP | Age: 72
End: 2017-07-11
Payer: MEDICARE

## 2017-07-11 VITALS
TEMPERATURE: 97.8 F | DIASTOLIC BLOOD PRESSURE: 80 MMHG | HEART RATE: 62 BPM | HEIGHT: 63 IN | WEIGHT: 212 LBS | OXYGEN SATURATION: 93 % | BODY MASS INDEX: 37.56 KG/M2 | SYSTOLIC BLOOD PRESSURE: 126 MMHG

## 2017-07-11 DIAGNOSIS — E66.9 OBESITY (BMI 35.0-39.9 WITHOUT COMORBIDITY): ICD-10-CM

## 2017-07-11 DIAGNOSIS — J45.30 MILD PERSISTENT ASTHMA WITHOUT COMPLICATION: ICD-10-CM

## 2017-07-11 DIAGNOSIS — Z23 NEED FOR SHINGLES VACCINE: ICD-10-CM

## 2017-07-11 DIAGNOSIS — K90.0 CELIAC DISEASE: ICD-10-CM

## 2017-07-11 DIAGNOSIS — E78.00 HYPERCHOLESTEROLEMIA: ICD-10-CM

## 2017-07-11 PROCEDURE — 99214 OFFICE O/P EST MOD 30 MIN: CPT | Performed by: INTERNAL MEDICINE

## 2017-07-11 ASSESSMENT — PAIN SCALES - GENERAL: PAINLEVEL: NO PAIN

## 2017-07-11 NOTE — MR AVS SNAPSHOT
"        Rayne Colroado   2017 11:20 AM   Office Visit   MRN: [id number]    Department:  [address] [place] [address]   Dept Phone:  [phone]    Description:  Female : 1945   Provider:  Carol Jang M.D.           Reason for Visit     Hyperlipidemia request lab work      Allergies as of 2017     No Known Allergies      You were diagnosed with     Mild persistent asthma without complication   [752997]       Hypercholesterolemia   [964332]       Obesity (BMI 35.0-39.9 without comorbidity) (HCC)   [745967]       Need for shingles vaccine   [701032]         Vital Signs     Blood Pressure Pulse Temperature Height Weight Body Mass Index    126/80 mmHg 62 36.6 °C (97.8 °F) 1.588 m (5' 2.5\") 96.163 kg (212 lb) 38.13 kg/m2    Oxygen Saturation Breastfeeding? Smoking Status             93% No Former Smoker         Basic Information     Date Of Birth Sex Race Ethnicity Preferred Language    1945 Female White Non- English      Your appointments     Aug 02, 2017 10:40 AM   Established Patient with Carol Jang M.D.   [address]  [place] [id number] [phone]           You will be receiving a confirmation call a few days before your appointment from our automated call confirmation system.            Aug 17, 2017 11:00 AM   MA SCRN10 with S SOHAIL MG 1   [facility] ([place])    [address]  [place] [id number] [phone]           No deodorant, powder, perfume or lotion under the arm or breast area.            Aug 17, 2017 11:15 AM   BONE DENSITY (DEXASCAN) with JEFF SAUCEDA BD 1   [facility] ([place])    [facility] [place]  [address]  [address]  [place] [id number] [phone]           No calcium supplements 24 hours prior to exam, including antacids or multivitamins w/calcium.  Pt may eat and drink normally.  No injection procedures prior " to Dexa on the same day.  No barium contrast, no CTs with IV or oral contrast, no Pet/CTs and no Nuc Med injections for 7 days prior to a Dexa (including Barium Swallow, Upper GI, Small Bowel Series).  If scheduling a Dexa on the same day as a CT with IV or oral contrast, any test with barium, Pet/CT or a Nuc Med with injection, always schedule the Dexa before the other study.              Problem List              ICD-10-CM Priority Class Noted - Resolved    Celiac disease K90.0   10/4/2016 - Present    Eczema of both hands L30.9   10/4/2016 - Present    Mild persistent asthma without complication J45.30   10/4/2016 - Present    Hypercholesterolemia E78.00   1/10/2017 - Present      Health Maintenance        Date Due Completion Dates    MAMMOGRAM 7/10/1986 ---    COLONOSCOPY 7/10/1996 ---    IMM ZOSTER VACCINE 7/10/2006 ---    BONE DENSITY 7/10/2011 ---    IMM INFLUENZA (1) 9/1/2017 11/5/2015    IMM DTaP/Tdap/Td Vaccine (2 - Td) 2/10/2026 2/10/2016            Current Immunizations     13-VALENT PCV PREVNAR 11/23/2015    Influenza TIV (IM) 11/5/2015    Pneumococcal polysaccharide vaccine (PPSV-23) 1/10/2017    Tdap Vaccine 2/10/2016      Below and/or attached are the medications your provider expects you to take. Review all of your home medications and newly ordered medications with your provider and/or pharmacist. Follow medication instructions as directed by your provider and/or pharmacist. Please keep your medication list with you and share with your provider. Update the information when medications are discontinued, doses are changed, or new medications (including over-the-counter products) are added; and carry medication information at all times in the event of emergency situations     Allergies:  No Known Allergies          Medications  Valid as of: July 11, 2017 - 12:02 PM    Generic Name Brand Name Tablet Size Instructions for use    Albuterol Sulfate (Aero Soln) albuterol 108 (90 BASE) MCG/ACT Inhale 2  Puffs by mouth every 6 hours as needed for Shortness of Breath.        Calcium Carbonate-Vitamin D   Take  by mouth.        Cholecalciferol (Cap) Vitamin D 2000 UNITS Take  by mouth.        Clobetasol Propionate (Cream) TEMOVATE 0.05 % Apply twice a day on affected skin for 7 days.        Fluticasone-Salmeterol (AEROSOL POWDER, BREATH ACTIVATED) ADVAIR DISKUS 500-50 MCG/DOSE inhale 1 puff by mouth twice a day IN THE MORNING AND EVENING APPROXIMATELY 12 HOURS APART        Glucosamine Sulfate (Cap) glucosamine Sulfate 500 MG Take 500 mg by mouth 3 times a day, with meals.        Loratadine (Tab) CLARITIN 10 MG Take 10 mg by mouth every day.        Montelukast Sodium (Tab) SINGULAIR 10 MG Take 1 Tab by mouth every day.        Multiple Vitamins-Minerals (Tab) THERAGRAN-M  Take 1 Tab by mouth every day.        Omega-3 Fatty Acids (Cap) fish oil 1000 MG Take 1,000 mg by mouth 3 times a day, with meals.        Probiotic Product   Take  by mouth.        Psyllium   Take  by mouth.        Zoster Vaccine Live (Recon Soln) ZOSTAVAX 02910 UNT/0.65ML Inject 0.65 mL as instructed Once for 1 dose.        .                 Medicines prescribed today were sent to:     [facility] [address]    Phone: [phone] Fax: [phone]    Open 24 Hours?: No      Medication refill instructions:       If your prescription bottle indicates you have medication refills left, it is not necessary to call your provider’s office. Please contact your pharmacy and they will refill your medication.    If your prescription bottle indicates you do not have any refills left, you may request refills at any time through one of the following ways: The online [employer] system (except Urgent Care), by calling your provider’s office, or by asking your pharmacy to contact your provider’s office with a refill request. Medication refills are processed only during regular business hours  and may not be available until the next business day. Your provider may request additional information or to have a follow-up visit with you prior to refilling your medication.   *Please Note: Medication refills are assigned a new Rx number when refilled electronically. Your pharmacy may indicate that no refills were authorized even though a new prescription for the same medication is available at the pharmacy. Please request the medicine by name with the pharmacy before contacting your provider for a refill.           MyChart Status: Patient Declined

## 2017-07-11 NOTE — Clinical Note
Critical access hospital  Carol Jang M.D.  [address] [place] [address]  Fax: [phone]   Authorization for Release/Disclosure of   Protected Health Information   Name: RAYNE COLORADO : 1945 SSN: [id number]   Address: [address] [place] [address] Phone:    [phone] (home)    I authorize the entity listed below to release/disclose the PHI below to:   Critical access hospital/Carol Jang M.D. and Carol Jang M.D.   Provider or Entity Name:  [facility]   Address   City, State, [facility]   Phone:      Fax:     Reason for request: continuity of care   Information to be released:    [xxx  ] LAST COLONOSCOPY,  including any PATH REPORT and follow-up  [  ] LAST FIT/COLOGUARD RESULT [  ] LAST DEXA  [  ] LAST MAMMOGRAM  [  ] LAST PAP  [  ] LAST LABS [  ] RETINA EXAM REPORT  [  ] IMMUNIZATION RECORDS  [  ] Release all info      [  ] Check here and initial the line next to each item to release ALL health information INCLUDING  _____ Care and treatment for drug and / or alcohol abuse  _____ HIV testing, infection status, or AIDS  _____ Genetic Testing    DATES OF SERVICE OR TIME PERIOD TO BE DISCLOSED: _____________  I understand and acknowledge that:  * This Authorization may be revoked at any time by you in writing, except if your health information has already been used or disclosed.  * Your health information that will be used or disclosed as a result of you signing this authorization could be re-disclosed by the recipient. If this occurs, your re-disclosed health information may no longer be protected by State or Federal laws.  * You may refuse to sign this Authorization. Your refusal will not affect your ability to obtain treatment.  * This Authorization becomes effective upon signing and will  on (date) __________.      If no date is indicated, this Authorization will  one (1) year from the signature date.    Name: Rayne [place]    Signature:   Date:     2017            PLEASE FAX REQUESTED RECORDS BACK TO: [phone]

## 2017-07-12 NOTE — ASSESSMENT & PLAN NOTE
Patient stated that her symptom is well controlled with gluten-free diet. She does not have any abdominal bloating or pain, or diarrhea. She has colonoscopy with GI consultant in 1/2016, and she will need to repeat it in 5 years in 2021. We will request the colonoscopy report from GI consultant.

## 2017-07-12 NOTE — ASSESSMENT & PLAN NOTE
Patient stated that she tried to cut down Advair inhaler once a day lately and noticed that she needs to use albuterol inhaler more frequently. She does not have side effects from using Advair or albuterol inhaler. She wants to continue current regimens. I discussed with patient that if she feels like more short of breath or wheezing from cutting down advair inhaler. She can go back to use it twice a day or we can cut down the dose to 250 micrograms twice a day instead of 500 µg twice a day. Patient agrees to continue to monitor and will see that she wants to cut down to 250 mcg on Advair inhaler.

## 2017-07-12 NOTE — PROGRESS NOTES
Subjective:   Rayne Colorado is a 72 y.o. female here today for evaluation and management of:      Mild persistent asthma without complication  Patient stated that she tried to cut down Advair inhaler once a day lately and noticed that she needs to use albuterol inhaler more frequently. She does not have side effects from using Advair or albuterol inhaler. She wants to continue current regimens. I discussed with patient that if she feels like more short of breath or wheezing from cutting down advair inhaler. She can go back to use it twice a day or we can cut down the dose to 250 micrograms twice a day instead of 500 µg twice a day. Patient agrees to continue to monitor and will see that she wants to cut down to 250 mcg on Advair inhaler.    Hypercholesterolemia  Patient tries to control her cholesterol with diet and exercise. She has never been treated with medication in the past. She will check lipid panel before next follow-up.    Celiac disease  Patient stated that her symptom is well controlled with gluten-free diet. She does not have any abdominal bloating or pain, or diarrhea. She has colonoscopy with GI consultant in 1/2016, and she will need to repeat it in 5 years in 2021. We will request the colonoscopy report from GI consultant.           Current medicines (including changes today)  Current Outpatient Prescriptions   Medication Sig Dispense Refill   • zoster vaccine live, PF, (ZOSTAVAX) 86456 UNT/0.65ML injection Inject 0.65 mL as instructed Once for 1 dose. 0.65 mL 0   • therapeutic multivitamin-minerals (THERAGRAN-M) Tab Take 1 Tab by mouth every day.     • Calcium Carbonate-Vitamin D (CALCIUM-D PO) Take  by mouth.     • glucosamine Sulfate 500 MG Cap Take 500 mg by mouth 3 times a day, with meals.     • Omega-3 Fatty Acids (FISH OIL) 1000 MG Cap capsule Take 1,000 mg by mouth 3 times a day, with meals.     • loratadine (CLARITIN) 10 MG Tab Take 10 mg by mouth every day.     • ADVAIR DISKUS 500-50  "MCG/DOSE AEROSOL POWDER, BREATH ACTIVATED inhale 1 puff by mouth twice a day IN THE MORNING AND EVENING APPROXIMATELY 12 HOURS APART 60 Inhaler 6   • montelukast (SINGULAIR) 10 MG Tab Take 1 Tab by mouth every day. 30 Tab 11   • albuterol 108 (90 BASE) MCG/ACT Aero Soln inhalation aerosol Inhale 2 Puffs by mouth every 6 hours as needed for Shortness of Breath. 8.5 g 3   • clobetasol (TEMOVATE) 0.05 % Cream Apply twice a day on affected skin for 7 days. 1 Tube 3   • Probiotic Product (PROBIOTIC ADVANCED PO) Take  by mouth.     • Psyllium (METAMUCIL PO) Take  by mouth.     • Cholecalciferol (VITAMIN D) 2000 UNITS Cap Take  by mouth.       No current facility-administered medications for this visit.     She  has a past medical history of Asthma and Celiac disease. She also has no past medical history of Encounter for long-term (current) use of other medications.    ROS   No chest pain, no shortness of breath, no abdominal pain       Objective:     Blood pressure 126/80, pulse 62, temperature 36.6 °C (97.8 °F), height 1.588 m (5' 2.5\"), weight 96.163 kg (212 lb), SpO2 93 %, not currently breastfeeding. Body mass index is 38.13 kg/(m^2).   Physical Exam:  General: Alert, oriented and no acute distress.  Eye contact is good, speech goal directed, affect calm  HEENT: conjunctiva non-injected, sclera non-icteric.  Oral mucous membranes pink and moist with no lesions.  Pinna normal.   Lungs: Normal respiratory effort, clear to auscultation bilaterally with good excursion.  CV: regular rate and rhythm. No murmurs.   Abdomen: soft, non distended, nontender, Bowel sound normal.  Ext: no edema, color normal, vascularity normal, temperature normal        Assessment and Plan:   The following treatment plan was discussed     1. Mild persistent asthma without complication  - Stable with current regimens. Noticed that she needs to use more rescue inhaler when she cut down advair inhaler.  - So will continue current regimens with " Advair and albuterol inhaler. Recheck lab 1-2 weeks before next follow up visit.  - Advised to rinse oral cavity after using advair inhaler.     2. Hypercholesterolemia  - Not on medication. Continue to control cholesterol with diet and exercise. Recheck lab 1-2 weeks before next follow up visit.  - Advised to try omega-3 1000 mg twice a day.    3. Celiac disease  - Well-controlled with gluten-free diet. Will obtain colonoscopy report from GI consultant.     4. Obesity (BMI 35.0-39.9 without comorbidity) ([facility])  - Counseled for healthy diet and regular physical exercise to lose weight.   - Patient identified as having weight management issue.  Appropriate orders and counseling given.    5. Need for shingles vaccine  - Shingles vaccine was prescribed today after reviewing risks and benefits as well as side effects of vaccine.  - zoster vaccine live, PF, (ZOSTAVAX) 43192 UNT/0.65ML injection; Inject 0.65 mL as instructed Once for 1 dose.  Dispense: 0.65 mL; Refill: 0      Followup: Return in about 3 weeks (around 8/2/2017), or if symptoms worsen or fail to improve, for asthma, hypercholesterolemia, celiac disease, lab review.      Please note that this dictation was created using voice recognition software. I have made every reasonable attempt to correct obvious errors, but I expect that there may have unintended errors in text, spelling, punctuation, or grammar that I did not discover.

## 2017-07-12 NOTE — ASSESSMENT & PLAN NOTE
Patient tries to control her cholesterol with diet and exercise. She has never been treated with medication in the past. She will check lipid panel before next follow-up.

## 2017-08-01 ENCOUNTER — TELEPHONE (OUTPATIENT)
Dept: MEDICAL GROUP | Age: 72
End: 2017-08-01

## 2017-08-01 NOTE — TELEPHONE ENCOUNTER
ESTABLISHED PATIENT PRE-VISIT PLANNING     Note: Patient will not be contacted if there is no indication to call.     1.  Reviewed notes from the last few office visits within the medical group: Yes    2.  If any orders were placed at last visit or intended to be done for this visit (i.e. 6 mos follow-up), do we have Results/Consult Notes?        •  Labs - Labs ordered, NOT completed. Patient advised to complete prior to next appointment.       •  Imaging - Imaging scheduled to be done 8/17/17       •  Referrals - No referrals were ordered at last office visit.    3. Is this appointment scheduled as a Hospital Follow-Up? No    4.  Immunizations were updated in [facility] using Web[employer]?: Yes       •  [facility] Recommendations: FLU, HEPATITIS A  and TD    5.  Patient is due for the following Health Maintenance Topics:   Health Maintenance Due   Topic Date Due   • MAMMOGRAM  07/10/1986   • IMM ZOSTER VACCINE  07/10/2006   • BONE DENSITY  07/10/2011       - Patient has completed PNEUMOVAX (PPSV23), PREVNAR (PCV13)  and TDAP Immunization(s) per WebIZ. Chart has been updated.    6.  Patient was NOT informed to arrive 15 min prior to their scheduled appointment and bring in their medication bottles.

## 2017-08-02 ENCOUNTER — OFFICE VISIT (OUTPATIENT)
Dept: MEDICAL GROUP | Age: 72
End: 2017-08-02
Payer: MEDICARE

## 2017-08-02 VITALS
TEMPERATURE: 98.2 F | OXYGEN SATURATION: 93 % | HEIGHT: 62 IN | BODY MASS INDEX: 38.46 KG/M2 | HEART RATE: 74 BPM | DIASTOLIC BLOOD PRESSURE: 80 MMHG | WEIGHT: 209 LBS | SYSTOLIC BLOOD PRESSURE: 126 MMHG

## 2017-08-02 DIAGNOSIS — J45.30 MILD PERSISTENT ASTHMA WITHOUT COMPLICATION: ICD-10-CM

## 2017-08-02 DIAGNOSIS — E78.00 HYPERCHOLESTEROLEMIA: ICD-10-CM

## 2017-08-02 DIAGNOSIS — L30.9 ECZEMA OF BOTH HANDS: ICD-10-CM

## 2017-08-02 PROCEDURE — 99214 OFFICE O/P EST MOD 30 MIN: CPT | Performed by: INTERNAL MEDICINE

## 2017-08-02 RX ORDER — ALBUTEROL SULFATE 90 UG/1
2 AEROSOL, METERED RESPIRATORY (INHALATION) EVERY 6 HOURS PRN
Qty: 8.5 G | Refills: 3 | Status: SHIPPED | OUTPATIENT
Start: 2017-08-02

## 2017-08-02 ASSESSMENT — PAIN SCALES - GENERAL: PAINLEVEL: NO PAIN

## 2017-08-02 NOTE — PROGRESS NOTES
Subjective:   Rayne Colorado is a 72 y.o. female here today for evaluation and management of:      Mild persistent asthma without complication  Patient stated that she is using Advair inhaler 500/50 mcg one puff once a day. She does not use albuterol inhaler at all. She is feeling better with current regimen. She agrees to chain Advair inhaler 250/50 mcg twice a day and she will use more albuterol inhaler as needed instead of relying on steroid inhaler. She denies side effects from using her current inhalers.    Hypercholesterolemia  Patient attempts to control her cholesterol with diet and exercise. She also takes omega-3 1000 mg 3 times a day. Recent lipid panel on 7/25/17 are stable and normal.    Eczema of both hands  Patient still using over-the-counter hydrocortisone cream. She already saw dermatologists in the past. She did not feel that topical cream prescribed by dermatologist worked for her. She was treated with clobetasol cream by me and she feels that medication is more affected. She wants to keep clobetasol cream. She did not really use clobetasol cream currently.         Current medicines (including changes today)  Current Outpatient Prescriptions   Medication Sig Dispense Refill   • fluticasone-salmeterol (ADVAIR) 250-50 MCG/DOSE AEROSOL POWDER, BREATH ACTIVATED Inhale 1 Puff by mouth every 12 hours. 1 Inhaler 6   • albuterol 108 (90 BASE) MCG/ACT Aero Soln inhalation aerosol Inhale 2 Puffs by mouth every 6 hours as needed for Shortness of Breath. 8.5 g 3   • therapeutic multivitamin-minerals (THERAGRAN-M) Tab Take 1 Tab by mouth every day.     • Calcium Carbonate-Vitamin D (CALCIUM-D PO) Take  by mouth.     • glucosamine Sulfate 500 MG Cap Take 500 mg by mouth 3 times a day, with meals.     • Omega-3 Fatty Acids (FISH OIL) 1000 MG Cap capsule Take 1,000 mg by mouth 3 times a day, with meals.     • loratadine (CLARITIN) 10 MG Tab Take 10 mg by mouth every day.     • montelukast (SINGULAIR) 10 MG  "Tab Take 1 Tab by mouth every day. 30 Tab 11   • clobetasol (TEMOVATE) 0.05 % Cream Apply twice a day on affected skin for 7 days. 1 Tube 3   • Probiotic Product (PROBIOTIC ADVANCED PO) Take  by mouth.     • Psyllium (METAMUCIL PO) Take  by mouth.     • Cholecalciferol (VITAMIN D) 2000 UNITS Cap Take  by mouth.       No current facility-administered medications for this visit.     She  has a past medical history of Asthma and Celiac disease. She also has no past medical history of Encounter for long-term (current) use of other medications.    ROS   No chest pain, no shortness of breath, no abdominal pain       Objective:     Blood pressure 126/80, pulse 74, temperature 36.8 °C (98.2 °F), height 1.575 m (5' 2\"), weight 94.802 kg (209 lb), SpO2 93 %, not currently breastfeeding. Body mass index is 38.22 kg/(m^2).   Physical Exam:  General: Alert, oriented and no acute distress.  Eye contact is good, speech goal directed, affect calm  HEENT: conjunctiva non-injected, sclera non-icteric.  Oral mucous membranes pink and moist with no lesions.  Pinna normal.   Lungs: Normal respiratory effort, Mild prolonged expiratory phase with respiratory wheezing but no rhonchi or crackles.   CV: regular rate and rhythm. No murmurs. No carotid bruits.  Abdomen: soft, non distended, nontender, No CVAT, Bowel sound normal.  Ext: no edema, color normal, vascularity normal, temperature normal  Musculoskeletal exam:       Assessment and Plan:   The following treatment plan was discussed     1. Mild persistent asthma without complication  - Well-controlled. Decreased Advair inhaler from 500-5 mcg twice a day to 250-50 mcg twice a day. Advised to rinse oral cavity after using Advair inhaler. She is advised to take albuterol inhaler as needed.   - Refilled Advair and albuterol inhaler.   - fluticasone-salmeterol (ADVAIR) 250-50 MCG/DOSE AEROSOL POWDER, BREATH ACTIVATED; Inhale 1 Puff by mouth every 12 hours.  Dispense: 1 Inhaler; Refill: 6  - " albuterol 108 (90 BASE) MCG/ACT Aero Soln inhalation aerosol; Inhale 2 Puffs by mouth every 6 hours as needed for Shortness of Breath.  Dispense: 8.5 g; Refill: 3  - CBC WITH DIFFERENTIAL; Future  - COMP METABOLIC PANEL; Future    2. Hypercholesterolemia  - Well-controlled. Not on medication.  - Advised to eat low fat, low carbohydrate and high fiber diet as well as do cardio physical exercise regularly.   - COMP METABOLIC PANEL; Future  - LIPID PROFILE; Future    3. Eczema of both hands  - Discussed to try clobetasol cream and gold bond moisturizing hand cream. Advised to try vitamin D and zinc.    4. Health Maintenance   - Patient has scheduled for mammogram and DEXA scan next week. She has a prescription for shingles vaccine and she is not ready to receive the shingles vaccine yet.    Followup: Return in about 6 months (around 2/2/2018), or if symptoms worsen or fail to improve, for asthma, hyperlipidemia, eczema, lab review.      Please note that this dictation was created using voice recognition software. I have made every reasonable attempt to correct obvious errors, but I expect that there may have unintended errors in text, spelling, punctuation, or grammar that I did not discover.

## 2017-08-02 NOTE — ASSESSMENT & PLAN NOTE
Patient stated that she is using Advair inhaler 500/50 mcg one puff once a day. She does not use albuterol inhaler at all. She is feeling better with current regimen. She agrees to chain Advair inhaler 250/50 mcg twice a day and she will use more albuterol inhaler as needed instead of relying on steroid inhaler. She denies side effects from using her current inhalers.

## 2017-08-02 NOTE — ASSESSMENT & PLAN NOTE
Patient still using over-the-counter hydrocortisone cream. She already saw dermatologists in the past. She did not feel that topical cream prescribed by dermatologist worked for her. She was treated with clobetasol cream by me and she feels that medication is more affected. She wants to keep clobetasol cream. She did not really use clobetasol cream currently.

## 2017-08-02 NOTE — ASSESSMENT & PLAN NOTE
Patient attempts to control her cholesterol with diet and exercise. She also takes omega-3 1000 mg 3 times a day. Recent lipid panel on 7/25/17 are stable and normal.

## 2017-08-02 NOTE — MR AVS SNAPSHOT
"        Rayne Colorado   2017 10:40 AM   Office Visit   MRN: [id number]    Department:  [address] [place] [address]   Dept Phone:  [phone]    Description:  Female : 1945   Provider:  Carol Jang M.D.           Allergies as of 2017     No Known Allergies      You were diagnosed with     Mild persistent asthma without complication   [269260]       Hypercholesterolemia   [207102]       Mild intermittent asthma without complication   [894902]   Well-controlled. Continue current regimens. Refill albuterol. Recommend to rinse oral cavity after using Advair diskus. Prescribed singular.      Vital Signs     Blood Pressure Pulse Temperature Height Weight Body Mass Index    126/80 mmHg 74 36.8 °C (98.2 °F) 1.575 m (5' 2\") 94.802 kg (209 lb) 38.22 kg/m2    Oxygen Saturation Breastfeeding? Smoking Status             93% No Former Smoker         Basic Information     Date Of Birth Sex Race Ethnicity Preferred Language    1945 Female White Non- English      Your appointments     Aug 17, 2017 11:00 AM   MA SCRN10 with S SOHAIL MG 1   [facility] ([place])    [address]  [place] [address]   [phone]           No deodorant, powder, perfume or lotion under the arm or breast area.            Aug 17, 2017 11:15 AM   BONE DENSITY (DEXASCAN) with S SOHAIL BD 1   IMAGING [facility] ([place])    Imaging [place]  [address]  [place] [address]   [phone]           No calcium supplements 24 hours prior to exam, including antacids or multivitamins w/calcium.  Pt may eat and drink normally.  No injection procedures prior to Dexa on the same day.  No barium contrast, no CTs with IV or oral contrast, no Pet/CTs and no Nuc Med injections for 7 days prior to a Dexa (including Barium Swallow, Upper GI, Small Bowel Series).  If scheduling a Dexa on the same day as a CT with IV or oral contrast, any test with " barium, Pet/CT or a Nuc Med with injection, always schedule the Dexa before the other study.            Feb 06, 2018 11:00 AM   Established Patient with Carol Jang M.D.   [address] ([facility])    [address] [place] [address]   [phone]           You will be receiving a confirmation call a few days before your appointment from our automated call confirmation system.              Problem List              ICD-10-CM Priority Class Noted - Resolved    Celiac disease K90.0   10/4/2016 - Present    Eczema of both hands L30.9   10/4/2016 - Present    Mild persistent asthma without complication J45.30   10/4/2016 - Present    Hypercholesterolemia E78.00   1/10/2017 - Present      Health Maintenance        Date Due Completion Dates    MAMMOGRAM 7/10/1986 ---    IMM ZOSTER VACCINE 7/10/2006 ---    BONE DENSITY 7/10/2011 ---    IMM INFLUENZA (1) 9/1/2017 11/5/2015    COLONOSCOPY 1/7/2021 1/7/2016    IMM DTaP/Tdap/Td Vaccine (2 - Td) 2/10/2026 2/10/2016            Current Immunizations     13-VALENT PCV PREVNAR 11/23/2015    Influenza TIV (IM) 11/5/2015    Pneumococcal polysaccharide vaccine (PPSV-23) 1/10/2017    Tdap Vaccine 2/10/2016      Below and/or attached are the medications your provider expects you to take. Review all of your home medications and newly ordered medications with your provider and/or pharmacist. Follow medication instructions as directed by your provider and/or pharmacist. Please keep your medication list with you and share with your provider. Update the information when medications are discontinued, doses are changed, or new medications (including over-the-counter products) are added; and carry medication information at all times in the event of emergency situations     Allergies:  No Known Allergies          Medications  Valid as of: August 02, 2017 - 11:37 AM    Generic Name Brand Name Tablet Size Instructions for use    Albuterol Sulfate (Aero Soln) albuterol  108 (90 BASE) MCG/ACT Inhale 2 Puffs by mouth every 6 hours as needed for Shortness of Breath.        Calcium Carbonate-Vitamin D   Take  by mouth.        Cholecalciferol (Cap) Vitamin D 2000 UNITS Take  by mouth.        Clobetasol Propionate (Cream) TEMOVATE 0.05 % Apply twice a day on affected skin for 7 days.        Fluticasone-Salmeterol (AEROSOL POWDER, BREATH ACTIVATED) ADVAIR 250-50 MCG/DOSE Inhale 1 Puff by mouth every 12 hours.        Glucosamine Sulfate (Cap) glucosamine Sulfate 500 MG Take 500 mg by mouth 3 times a day, with meals.        Loratadine (Tab) CLARITIN 10 MG Take 10 mg by mouth every day.        Montelukast Sodium (Tab) SINGULAIR 10 MG Take 1 Tab by mouth every day.        Multiple Vitamins-Minerals (Tab) THERAGRAN-M  Take 1 Tab by mouth every day.        Omega-3 Fatty Acids (Cap) fish oil 1000 MG Take 1,000 mg by mouth 3 times a day, with meals.        Probiotic Product   Take  by mouth.        Psyllium   Take  by mouth.        .                 Medicines prescribed today were sent to:     [facility] [address] [address]    Phone: [phone] Fax: [phone]    Open 24 Hours?: No      Medication refill instructions:       If your prescription bottle indicates you have medication refills left, it is not necessary to call your provider’s office. Please contact your pharmacy and they will refill your medication.    If your prescription bottle indicates you do not have any refills left, you may request refills at any time through one of the following ways: The online [employer] system (except Urgent Care), by calling your provider’s office, or by asking your pharmacy to contact your provider’s office with a refill request. Medication refills are processed only during regular business hours and may not be available until the next business day. Your provider may request additional information or to have a follow-up visit with  you prior to refilling your medication.   *Please Note: Medication refills are assigned a new Rx number when refilled electronically. Your pharmacy may indicate that no refills were authorized even though a new prescription for the same medication is available at the pharmacy. Please request the medicine by name with the pharmacy before contacting your provider for a refill.        Your To Do List     Future Labs/Procedures Complete By Expires    CBC WITH DIFFERENTIAL  As directed 8/3/2018    COMP METABOLIC PANEL  As directed 8/3/2018    LIPID PROFILE  As directed 8/3/2018         MyChart Status: Patient Declined

## 2017-08-16 ENCOUNTER — TELEPHONE (OUTPATIENT)
Dept: HEALTH INFORMATION MANAGEMENT | Facility: OTHER | Age: 72
End: 2017-08-16

## 2017-08-16 DIAGNOSIS — Z78.0 POSTMENOPAUSAL ESTROGEN DEFICIENCY: ICD-10-CM

## 2017-08-17 ENCOUNTER — HOSPITAL ENCOUNTER (OUTPATIENT)
Dept: RADIOLOGY | Facility: MEDICAL CENTER | Age: 72
End: 2017-08-17
Attending: INTERNAL MEDICINE
Payer: MEDICARE

## 2017-08-17 DIAGNOSIS — Z12.31 VISIT FOR SCREENING MAMMOGRAM: ICD-10-CM

## 2017-08-17 DIAGNOSIS — Z78.0 POSTMENOPAUSAL ESTROGEN DEFICIENCY: ICD-10-CM

## 2017-08-17 PROCEDURE — 77080 DXA BONE DENSITY AXIAL: CPT

## 2017-08-17 PROCEDURE — 77063 BREAST TOMOSYNTHESIS BI: CPT

## 2017-08-21 ENCOUNTER — HOSPITAL ENCOUNTER (OUTPATIENT)
Dept: RADIOLOGY | Facility: MEDICAL CENTER | Age: 72
End: 2017-08-21

## 2017-09-23 ENCOUNTER — HOSPITAL ENCOUNTER (OUTPATIENT)
Dept: RADIOLOGY | Facility: MEDICAL CENTER | Age: 72
End: 2017-09-23

## 2017-10-28 DIAGNOSIS — J45.20 MILD INTERMITTENT ASTHMA WITHOUT COMPLICATION: ICD-10-CM

## 2017-10-30 RX ORDER — MONTELUKAST SODIUM 10 MG/1
TABLET ORAL
Qty: 30 TAB | Refills: 11 | Status: SHIPPED | OUTPATIENT
Start: 2017-10-30

## 2018-02-05 NOTE — ASSESSMENT & PLAN NOTE
Her symptoms is well-controlled with gluten-free diet. She denied diarrhea or abdominal pain or blood in stool. She has colonoscopy with GI consultant on 1/7/16 and she will need to repeat it in 5 years.

## 2018-02-05 NOTE — ASSESSMENT & PLAN NOTE
Patient tries to control her cholesterol with diet and exercise. Her lipid panel showed improvement on 7/25/17. She is still taking omega-3 3 times a day with meal. Recent lipid panel on 2/1/18 showed that total cholesterol 185, HDL 71, LDL 98, TG 80.

## 2018-02-05 NOTE — ASSESSMENT & PLAN NOTE
Patient has persistent asthma and she needs to use Advair inhaler high-dose constantly. We have been weaned down Advair inhaler from 500/50 mcg to moderate dose to 250/50 µg twice a day on 8/2/17. , She is doing well with current regimens of Advair inhaler and albuterol inhaler. She denies side effects from using Advair and albuterol inhalers.

## 2018-02-06 ENCOUNTER — OFFICE VISIT (OUTPATIENT)
Dept: MEDICAL GROUP | Age: 73
End: 2018-02-06
Payer: MEDICARE

## 2018-02-06 VITALS
WEIGHT: 213.2 LBS | DIASTOLIC BLOOD PRESSURE: 82 MMHG | OXYGEN SATURATION: 95 % | SYSTOLIC BLOOD PRESSURE: 124 MMHG | HEART RATE: 74 BPM | HEIGHT: 62 IN | BODY MASS INDEX: 39.23 KG/M2 | TEMPERATURE: 97.9 F

## 2018-02-06 DIAGNOSIS — Z23 NEEDS FLU SHOT: ICD-10-CM

## 2018-02-06 DIAGNOSIS — J45.30 MILD PERSISTENT ASTHMA WITHOUT COMPLICATION: ICD-10-CM

## 2018-02-06 DIAGNOSIS — E66.9 OBESITY (BMI 35.0-39.9 WITHOUT COMORBIDITY): ICD-10-CM

## 2018-02-06 DIAGNOSIS — K90.0 CELIAC DISEASE: ICD-10-CM

## 2018-02-06 DIAGNOSIS — E78.00 HYPERCHOLESTEROLEMIA: ICD-10-CM

## 2018-02-06 DIAGNOSIS — L30.9 ECZEMA OF BOTH HANDS: ICD-10-CM

## 2018-02-06 DIAGNOSIS — Z23 NEED FOR SHINGLES VACCINE: ICD-10-CM

## 2018-02-06 PROCEDURE — G0008 ADMIN INFLUENZA VIRUS VAC: HCPCS | Performed by: INTERNAL MEDICINE

## 2018-02-06 PROCEDURE — 90662 IIV NO PRSV INCREASED AG IM: CPT | Performed by: INTERNAL MEDICINE

## 2018-02-06 PROCEDURE — 99214 OFFICE O/P EST MOD 30 MIN: CPT | Mod: 25 | Performed by: INTERNAL MEDICINE

## 2018-02-06 NOTE — ASSESSMENT & PLAN NOTE
Patient stated that her rash improved on both hands and she stopped using clobetasol cream already. She is currently using herbal moisturizing cream from over-the-counter.

## 2018-02-06 NOTE — PROGRESS NOTES
Subjective:   Rayne Colorado is a 72 y.o. female here today for evaluation and management of:      Mild persistent asthma without complication  Patient has persistent asthma and she needs to use Advair inhaler high-dose constantly. We have been weaned down Advair inhaler from 500/50 mcg to moderate dose to 250/50 µg twice a day on 8/2/17. , She is doing well with current regimens of Advair inhaler and albuterol inhaler. She denies side effects from using Advair and albuterol inhalers.    Hypercholesterolemia  Patient tries to control her cholesterol with diet and exercise. Her lipid panel showed improvement on 7/25/17. She is still taking omega-3 3 times a day with meal. Recent lipid panel on 2/1/18 showed that total cholesterol 185, HDL 71, LDL 98, TG 80.     Celiac disease  Her symptoms is well-controlled with gluten-free diet. She denied diarrhea or abdominal pain or blood in stool. She has colonoscopy with GI consultant on 1/7/16 and she will need to repeat it in 5 years.     Eczema of both hands  Patient stated that her rash improved on both hands and she stopped using clobetasol cream already. She is currently using herbal moisturizing cream from over-the-counter.         Current medicines (including changes today)  Current Outpatient Prescriptions   Medication Sig Dispense Refill   • Zoster Vaccine Live (ZOSTAVAX) 50592 UNT/0.65ML Recon Susp Inject 0.65 mL as instructed Once for 1 dose. 0.65 mL 0   • montelukast (SINGULAIR) 10 MG Tab take 1 tablet by mouth once daily 30 Tab 11   • fluticasone-salmeterol (ADVAIR) 250-50 MCG/DOSE AEROSOL POWDER, BREATH ACTIVATED Inhale 1 Puff by mouth every 12 hours. 1 Inhaler 6   • albuterol 108 (90 BASE) MCG/ACT Aero Soln inhalation aerosol Inhale 2 Puffs by mouth every 6 hours as needed for Shortness of Breath. 8.5 g 3   • therapeutic multivitamin-minerals (THERAGRAN-M) Tab Take 1 Tab by mouth every day.     • Calcium Carbonate-Vitamin D (CALCIUM-D PO) Take  by mouth.    "  • glucosamine Sulfate 500 MG Cap Take 500 mg by mouth 3 times a day, with meals.     • Omega-3 Fatty Acids (FISH OIL) 1000 MG Cap capsule Take 1,000 mg by mouth 2 times a day, with meals.     • loratadine (CLARITIN) 10 MG Tab Take 10 mg by mouth every day.     • Probiotic Product (PROBIOTIC ADVANCED PO) Take  by mouth.     • Psyllium (METAMUCIL PO) Take  by mouth.     • Cholecalciferol (VITAMIN D) 2000 UNITS Cap Take  by mouth.       No current facility-administered medications for this visit.      She  has a past medical history of Asthma and Celiac disease. She also has no past medical history of Encounter for long-term (current) use of other medications.    ROS   No chest pain, no shortness of breath, no abdominal pain       Objective:     Blood pressure 124/82, pulse 74, temperature 36.6 °C (97.9 °F), height 1.575 m (5' 2\"), weight 96.7 kg (213 lb 3.2 oz), SpO2 95 %. Body mass index is 38.99 kg/m².   Physical Exam:  General: Alert, oriented and no acute distress.  Eye contact is good, speech goal directed, affect calm  HEENT: conjunctiva non-injected, sclera non-icteric.  Oral mucous membranes pink and moist with no lesions.  Pinna normal.   Lungs: Normal respiratory effort, clear to auscultation bilaterally with good excursion.  CV: regular rate and rhythm. No murmurs.   Abdomen: soft, non distended, nontender, Bowel sound normal.  Ext: no edema, color normal, vascularity normal, temperature normal      Assessment and Plan:   The following treatment plan was discussed     1. Mild persistent asthma without complication  - well-controlled. Improved with current regimens. Continue Advair inhaler twice a day and Albuterol inhaler as needed.   - Advised to rinse oral cavity after using advair inhaler.  - CBC WITH DIFFERENTIAL; Future  - COMP METABOLIC PANEL; Future    2. Hypercholesterolemia  - Well-controlled. Continue to control with diet and physical exercise. Continue Omega 3 twice a day.  - Advised to eat low " fat, low carbohydrate and high fiber diet as well as do cardio physical exercise regularly.   - COMP METABOLIC PANEL; Future  - LIPID PROFILE; Future    3. Celiac disease  - Stable and improved. Continued to control with gluten free diet.    4. Eczema of both hands  - Rash resolved. Discontinue clobetasol cream. Continue moisturizing cream.    5. Needs flu shot  - Influenza vaccine was given today after reviewing risks and benefits as well as side effects of vaccine.  - INFLUENZA VACCINE, HIGH DOSE (65+ ONLY)    6. Obesity (BMI 35.0-39.9 without comorbidity)  - Counseled for healthy diet and regular physical exercise to lose weight.   - Patient identified as having weight management issue.  Appropriate orders and counseling given.    7. Need for shingles vaccine  - Zostavax vaccine was prescribed today after reviewing risks and benefits as well as side effects of vaccine.  - Zoster Vaccine Live (ZOSTAVAX) 22439 UNT/0.65ML Recon Susp; Inject 0.65 mL as instructed Once for 1 dose.  Dispense: 0.65 mL; Refill: 0        Followup: Return in about 3 months (around 5/6/2018), or if symptoms worsen or fail to improve, for asthma, hyperlipidemia, celiac disease, lab review.      Please note that this dictation was created using voice recognition software. I have made every reasonable attempt to correct obvious errors, but I expect that there may have unintended errors in text, spelling, punctuation, or grammar that I did not discover.

## 2018-05-23 ENCOUNTER — OFFICE VISIT (OUTPATIENT)
Dept: MEDICAL GROUP | Age: 73
End: 2018-05-23
Payer: MEDICARE

## 2018-05-23 VITALS
WEIGHT: 216.2 LBS | SYSTOLIC BLOOD PRESSURE: 124 MMHG | HEIGHT: 62 IN | RESPIRATION RATE: 14 BRPM | HEART RATE: 90 BPM | BODY MASS INDEX: 39.79 KG/M2 | TEMPERATURE: 97.5 F | OXYGEN SATURATION: 95 % | DIASTOLIC BLOOD PRESSURE: 74 MMHG

## 2018-05-23 DIAGNOSIS — J30.1 SEASONAL ALLERGIC RHINITIS DUE TO POLLEN: ICD-10-CM

## 2018-05-23 DIAGNOSIS — J45.30 MILD PERSISTENT ASTHMA WITHOUT COMPLICATION: ICD-10-CM

## 2018-05-23 DIAGNOSIS — K90.0 CELIAC DISEASE: ICD-10-CM

## 2018-05-23 DIAGNOSIS — E78.00 HYPERCHOLESTEROLEMIA: ICD-10-CM

## 2018-05-23 PROCEDURE — 99214 OFFICE O/P EST MOD 30 MIN: CPT | Performed by: INTERNAL MEDICINE

## 2018-05-23 NOTE — ASSESSMENT & PLAN NOTE
Patient reported that she has runny nose, itchy eye, mostly in spring and fall. She is taking Claritin 10 mg once a day in the daytime and Singulair 10 mg once a day. She stated that her allergy symptoms improved with current regimens. We also discussed to try sinus rinse from over-the-counter.

## 2018-05-23 NOTE — PROGRESS NOTES
Subjective:   Rayne Colorado is a 72 y.o. female here today for evaluation and management of:      Seasonal allergic rhinitis due to pollen  Patient reported that she has runny nose, itchy eye, mostly in spring and fall. She is taking Claritin 10 mg once a day in the daytime and Singulair 10 mg once a day. She stated that her allergy symptoms improved with current regimens. We also discussed to try sinus rinse from over-the-counter.    Mild persistent asthma without complication  Her asthma is better control with Advair inhaler. We have been decreasing the dose of Advair inhaler from 500/50 µg one puff twice a day to 250/50 µg twice a day since August 2017. She is taking albuterol inhaler 2-3 times a day as needed. She is doing well with current regimens and does not have asthmatic attack in past 6 months. She stated that she rinse her mouth after using Advair inhaler.    Hypercholesterolemia  Patient attempts to control cholesterol with diet and exercise. Her lipid panel is well controlled. Recent lipid panel on 5/16/18 showed total cholesterol 192, HDL 91, LDL 85, triglyceride 79. She is taking omega-3 daily.    Celiac disease  Patient has celiac disease and she is on gluten-free diet. She has colonoscopy with GI consultant on 1/7/16. She has few polyps removed from colonoscopy and biopsy report showed that she has tubular adenoma. She needs to repeat colonoscopy in 5 years. Patient stated that she does not have diarrhea or abdominal pain or blood in stool.         Current medicines (including changes today)  Current Outpatient Prescriptions   Medication Sig Dispense Refill   • montelukast (SINGULAIR) 10 MG Tab take 1 tablet by mouth once daily 30 Tab 11   • fluticasone-salmeterol (ADVAIR) 250-50 MCG/DOSE AEROSOL POWDER, BREATH ACTIVATED Inhale 1 Puff by mouth every 12 hours. 1 Inhaler 6   • albuterol 108 (90 BASE) MCG/ACT Aero Soln inhalation aerosol Inhale 2 Puffs by mouth every 6 hours as needed for  "Shortness of Breath. 8.5 g 3   • therapeutic multivitamin-minerals (THERAGRAN-M) Tab Take 1 Tab by mouth every day.     • Calcium Carbonate-Vitamin D (CALCIUM-D PO) Take  by mouth.     • glucosamine Sulfate 500 MG Cap Take 500 mg by mouth 3 times a day, with meals.     • Omega-3 Fatty Acids (FISH OIL) 1000 MG Cap capsule Take 1,000 mg by mouth 2 times a day, with meals.     • loratadine (CLARITIN) 10 MG Tab Take 10 mg by mouth every day.     • Probiotic Product (PROBIOTIC ADVANCED PO) Take  by mouth.     • Psyllium (METAMUCIL PO) Take  by mouth.     • Cholecalciferol (VITAMIN D) 2000 UNITS Cap Take  by mouth.       No current facility-administered medications for this visit.      She  has a past medical history of Asthma and Celiac disease. She also has no past medical history of Encounter for long-term (current) use of other medications.    ROS   No chest pain, no shortness of breath, no abdominal pain       Objective:     Blood pressure 124/74, pulse 90, temperature 36.4 °C (97.5 °F), resp. rate 14, height 1.575 m (5' 2\"), weight 98.1 kg (216 lb 3.2 oz), SpO2 95 %, not currently breastfeeding. Body mass index is 39.54 kg/m².   Physical Exam:  General: Alert, oriented and no acute distress.  Eye contact is good, speech goal directed, affect calm  HEENT: conjunctiva non-injected, sclera non-icteric.  Oral mucous membranes pink and moist with no lesions.  Pinna normal.   Lungs: Normal respiratory effort, clear to auscultation bilaterally with good excursion.  CV: regular rate and rhythm. No murmurs.   Abdomen: soft, non distended, nontender, Bowel sound normal.  Ext: no edema, color normal, vascularity normal, temperature normal        Assessment and Plan:   The following treatment plan was discussed     1. Mild persistent asthma without complication  - Well-controlled. Continue Advair inhaler 250/50 µg one puff twice a day and albuterol inhaler 2 puffs 2-3 times a day as needed. Reviewed the use and side effects of " Advair inhaler and albuterol inhaler with patient.  - Recommend to rinse oral cavity after using Advair inhaler.  - Continue singular 10 mg once a day.    2. Hypercholesterolemia  - Well-controlled. Continue to control with diet and exercise.  - Advised to eat low fat, low carbohydrate and high fiber diet as well as do cardio physical exercise regularly.      3. Seasonal allergic rhinitis due to pollen  - Discussed at length to rinse sinuses with over the counter nasal wash or Netti pot once or twice a day and then use flonase nasal spray once or twice a day after rinsing sinuses  - Advised to try nasal steam therapy when she has sinus congestion.   - She can continue Claritin 10 mg daily as needed.  - Recommend to increase water intake.    4. Celiac disease  - Well-controlled. Continue to control with gluten-free diet.    5. Health Maintenance   - Patient has colonoscopy on 1/7/16 and showed that she has tubular adenomatous polyps. She needs to repeat colonoscopy in 5 years. She has normal mammogram on 8/17/17. She has DEXA scan on 8/17/17 showed osteopenia. Immunizations are up-to-date. She completed Pneumovax and Prevnar 13 vaccine.    Followup: Return if symptoms worsen or fail to improve, for asthma, hyperlipidemia, celiac disease.      Please note that this dictation was created using voice recognition software. I have made every reasonable attempt to correct obvious errors, but I expect that there may have unintended errors in text, spelling, punctuation, or grammar that I did not discover.

## 2018-05-23 NOTE — ASSESSMENT & PLAN NOTE
Her asthma is better control with Advair inhaler. We have been decreasing the dose of Advair inhaler from 500/50 µg one puff twice a day to 250/50 µg twice a day since August 2017. She is taking albuterol inhaler 2-3 times a day as needed. She is doing well with current regimens and does not have asthmatic attack in past 6 months. She stated that she rinse her mouth after using Advair inhaler.

## 2018-05-23 NOTE — ASSESSMENT & PLAN NOTE
Patient has celiac disease and she is on gluten-free diet. She has colonoscopy with GI consultant on 1/7/16. She has few polyps removed from colonoscopy and biopsy report showed that she has tubular adenoma. She needs to repeat colonoscopy in 5 years. Patient stated that she does not have diarrhea or abdominal pain or blood in stool.

## 2018-05-23 NOTE — ASSESSMENT & PLAN NOTE
Patient attempts to control cholesterol with diet and exercise. Her lipid panel is well controlled. Recent lipid panel on 5/16/18 showed total cholesterol 192, HDL 91, LDL 85, triglyceride 79. She is taking omega-3 daily.

## 2018-06-15 DIAGNOSIS — J45.30 MILD PERSISTENT ASTHMA WITHOUT COMPLICATION: ICD-10-CM
